# Patient Record
Sex: FEMALE | Race: OTHER | NOT HISPANIC OR LATINO | URBAN - METROPOLITAN AREA
[De-identification: names, ages, dates, MRNs, and addresses within clinical notes are randomized per-mention and may not be internally consistent; named-entity substitution may affect disease eponyms.]

---

## 2018-03-28 ENCOUNTER — EMERGENCY (EMERGENCY)
Facility: HOSPITAL | Age: 38
LOS: 1 days | Discharge: HOME | End: 2018-03-28
Attending: EMERGENCY MEDICINE | Admitting: EMERGENCY MEDICINE
Payer: MEDICAID

## 2018-03-28 VITALS
SYSTOLIC BLOOD PRESSURE: 102 MMHG | RESPIRATION RATE: 16 BRPM | OXYGEN SATURATION: 100 % | DIASTOLIC BLOOD PRESSURE: 64 MMHG | TEMPERATURE: 98 F | HEART RATE: 82 BPM

## 2018-03-28 LAB
ALBUMIN SERPL ELPH-MCNC: 4 G/DL — SIGNIFICANT CHANGE UP (ref 3.3–5)
ALP SERPL-CCNC: 41 U/L — SIGNIFICANT CHANGE UP (ref 40–120)
ALT FLD-CCNC: 16 U/L — SIGNIFICANT CHANGE UP (ref 4–33)
APTT BLD: 27.9 SEC — SIGNIFICANT CHANGE UP (ref 27.5–37.4)
AST SERPL-CCNC: 21 U/L — SIGNIFICANT CHANGE UP (ref 4–32)
BILIRUB SERPL-MCNC: < 0.2 MG/DL — LOW (ref 0.2–1.2)
BUN SERPL-MCNC: 11 MG/DL — SIGNIFICANT CHANGE UP (ref 7–23)
CALCIUM SERPL-MCNC: 8.7 MG/DL — SIGNIFICANT CHANGE UP (ref 8.4–10.5)
CHLORIDE SERPL-SCNC: 104 MMOL/L — SIGNIFICANT CHANGE UP (ref 98–107)
CK MB BLD-MCNC: 2.26 NG/ML — SIGNIFICANT CHANGE UP (ref 1–4.7)
CK SERPL-CCNC: 132 U/L — SIGNIFICANT CHANGE UP (ref 25–170)
CO2 SERPL-SCNC: 23 MMOL/L — SIGNIFICANT CHANGE UP (ref 22–31)
CREAT SERPL-MCNC: 0.6 MG/DL — SIGNIFICANT CHANGE UP (ref 0.5–1.3)
GLUCOSE SERPL-MCNC: 119 MG/DL — HIGH (ref 70–99)
HCT VFR BLD CALC: 25.5 % — LOW (ref 34.5–45)
HGB BLD-MCNC: 7.2 G/DL — LOW (ref 11.5–15.5)
INR BLD: 0.99 — SIGNIFICANT CHANGE UP (ref 0.88–1.17)
MCHC RBC-ENTMCNC: 19.3 PG — LOW (ref 27–34)
MCHC RBC-ENTMCNC: 28.2 % — LOW (ref 32–36)
MCV RBC AUTO: 68.2 FL — LOW (ref 80–100)
NRBC # FLD: 0 — SIGNIFICANT CHANGE UP
PLATELET # BLD AUTO: 270 K/UL — SIGNIFICANT CHANGE UP (ref 150–400)
PMV BLD: 10.4 FL — SIGNIFICANT CHANGE UP (ref 7–13)
POTASSIUM SERPL-MCNC: 3.8 MMOL/L — SIGNIFICANT CHANGE UP (ref 3.5–5.3)
POTASSIUM SERPL-SCNC: 3.8 MMOL/L — SIGNIFICANT CHANGE UP (ref 3.5–5.3)
PROT SERPL-MCNC: 6.9 G/DL — SIGNIFICANT CHANGE UP (ref 6–8.3)
PROTHROM AB SERPL-ACNC: 11 SEC — SIGNIFICANT CHANGE UP (ref 9.8–13.1)
RBC # BLD: 3.74 M/UL — LOW (ref 3.8–5.2)
RBC # FLD: 18.4 % — HIGH (ref 10.3–14.5)
SODIUM SERPL-SCNC: 139 MMOL/L — SIGNIFICANT CHANGE UP (ref 135–145)
TROPONIN T SERPL-MCNC: < 0.06 NG/ML — SIGNIFICANT CHANGE UP (ref 0–0.06)
TSH SERPL-MCNC: 1.15 UIU/ML — SIGNIFICANT CHANGE UP (ref 0.27–4.2)
WBC # BLD: 5.84 K/UL — SIGNIFICANT CHANGE UP (ref 3.8–10.5)
WBC # FLD AUTO: 5.84 K/UL — SIGNIFICANT CHANGE UP (ref 3.8–10.5)

## 2018-03-28 PROCEDURE — 93010 ELECTROCARDIOGRAM REPORT: CPT | Mod: 59

## 2018-03-28 PROCEDURE — 71046 X-RAY EXAM CHEST 2 VIEWS: CPT | Mod: 26

## 2018-03-28 PROCEDURE — 99285 EMERGENCY DEPT VISIT HI MDM: CPT | Mod: 25

## 2018-03-28 RX ORDER — ASPIRIN/CALCIUM CARB/MAGNESIUM 324 MG
324 TABLET ORAL ONCE
Qty: 0 | Refills: 0 | Status: COMPLETED | OUTPATIENT
Start: 2018-03-28 | End: 2018-03-28

## 2018-03-28 RX ADMIN — Medication 324 MILLIGRAM(S): at 22:41

## 2018-03-28 NOTE — ED PROVIDER NOTE - OBJECTIVE STATEMENT
Attending  38yo [facility] female pw chest pain for "a long time" Described as midsternal pressure/tightness intermittent, "sometimes" worse with eating and "moving" exertion. Non radiating, not associated with sob/nausea/ diaphoresis . Endorse a "fluttering sensation" lately.  Feeling lightheaded. no travel. non smoker. not on OCP. no leg swelling or calf tenderness. no abdominal pain. no cp now. Hasn't have any cardiac workup Attending  38yo [place] female pw chest pain for "a long time" Described as midsternal pressure/tightness intermittent, "sometimes" worse with eating and "moving" exertion. Non radiating, not associated with sob/nausea/ diaphoresis . Endorse a "fluttering sensation" lately.  Feeling lightheaded. no travel. non smoker. not on OCP. no leg swelling or calf tenderness. no abdominal pain. no cp now last episode of cp at 5pm. Hasn't have any cardiac workup. took a baby aspirin pta  no pmd. no cardiologist

## 2018-03-28 NOTE — ED ADULT TRIAGE NOTE - CHIEF COMPLAINT QUOTE
C/o chest pain since last night with chest "fluttering". Denies sob or dizziness. Denies medical hx. Denies pain at present after taking ASA at home PTA.      Took 4 baby ASA PTA.

## 2018-03-28 NOTE — ED PROVIDER NOTE - PHYSICAL EXAMINATION
Attending  pt no distress. nontoxic appearing. mmm. no goiter.   normal S1-S2 No resp distress. able to speak in full and clear sentences. no wheeze, rales or stridor. soft nontender abdomen. no  rebound. no guarding. no sign of trauma. no CVAT no pedal edema. no calf tenderness. normal pulses bilateral feet.

## 2018-03-28 NOTE — ED ADULT NURSE NOTE - OBJECTIVE STATEMENT
pt aox3; reports chest pain that has been going on for several weeks; reports last night she felt fluttering in her chest.  Reports she is under stress and that the chest pain worsens with her stress as well as "eating fried foods" Pt presents in no acute respiratory distress at this time; placed on cardiac monitor in NSR; vss.  IV placed in left ac 20g; labs sent.  Pt reports she feels dizzy at this time and wanted her blood sugar to be checks; denies being diabetic but reports her mother is diabetic so she is concerned about her glucose levels.  Fingerstick w/i normal limits. Will continue to monitor/asssess

## 2018-03-28 NOTE — ED PROVIDER NOTE - PROGRESS NOTE DETAILS
No CDU beds available. pt denies hx of anemia, hasn't had hemoglobin checked in a long time. No prior transfusion. LMP 2 weeks ago, "very heavy first two days" not bleeding now. no melena. not dizzy or lightheaded. pt denies hx of anemia, hasn't had hemoglobin checked in a long time. No prior transfusion. LMP 2 weeks ago, "very heavy first two days" not bleeding now. no melena. not dizzy or lightheaded. no URIBE. no orthopnoea pt pending stool occult and labs. Regla: s/o to me by dr. wise. pt with intermittent brief chest pain. no sob, dizziness, lightheadedness and fatigue. hgb found to be 7.2, low mcv. per dr. wise, no need for PRBC. I advised CDU PA to recheck hgb in am.

## 2018-03-28 NOTE — ED ADULT NURSE REASSESSMENT NOTE - NS ED NURSE REASSESS COMMENT FT1
RN Break Coverage : Alert and oriented x 4. Pt received from JORDAN Putnam in no distress. Medication given as ordered.  Sinus rhythm on cardiac monitor. Will continue to monitor.

## 2018-03-28 NOTE — ED PROVIDER NOTE - MEDICAL DECISION MAKING DETAILS
plan ekg, cxr, labs, tele monitoring, admit plan ekg, cxr, labs w tsh, tele monitoring, admit  no PE risk factor, no sig ACS risk factor except guyanense background

## 2018-03-29 VITALS
SYSTOLIC BLOOD PRESSURE: 107 MMHG | TEMPERATURE: 99 F | RESPIRATION RATE: 17 BRPM | DIASTOLIC BLOOD PRESSURE: 63 MMHG | HEART RATE: 78 BPM | OXYGEN SATURATION: 100 %

## 2018-03-29 LAB
APPEARANCE UR: CLEAR — SIGNIFICANT CHANGE UP
BACTERIA # UR AUTO: SIGNIFICANT CHANGE UP
BASOPHILS # BLD AUTO: 0.02 K/UL — SIGNIFICANT CHANGE UP (ref 0–0.2)
BASOPHILS # BLD AUTO: 0.02 K/UL — SIGNIFICANT CHANGE UP (ref 0–0.2)
BASOPHILS # BLD AUTO: 0.04 K/UL — SIGNIFICANT CHANGE UP (ref 0–0.2)
BASOPHILS NFR BLD AUTO: 0.4 % — SIGNIFICANT CHANGE UP (ref 0–2)
BASOPHILS NFR BLD AUTO: 0.5 % — SIGNIFICANT CHANGE UP (ref 0–2)
BASOPHILS NFR BLD AUTO: 0.7 % — SIGNIFICANT CHANGE UP (ref 0–2)
BILIRUB UR-MCNC: NEGATIVE — SIGNIFICANT CHANGE UP
BLD GP AB SCN SERPL QL: NEGATIVE — SIGNIFICANT CHANGE UP
BLOOD UR QL VISUAL: NEGATIVE — SIGNIFICANT CHANGE UP
CHOLEST SERPL-MCNC: 121 MG/DL — SIGNIFICANT CHANGE UP (ref 120–199)
CK MB BLD-MCNC: 1.74 NG/ML — SIGNIFICANT CHANGE UP (ref 1–4.7)
CK MB BLD-MCNC: SIGNIFICANT CHANGE UP (ref 0–2.5)
CK SERPL-CCNC: 103 U/L — SIGNIFICANT CHANGE UP (ref 25–170)
COLOR SPEC: SIGNIFICANT CHANGE UP
EOSINOPHIL # BLD AUTO: 0.05 K/UL — SIGNIFICANT CHANGE UP (ref 0–0.5)
EOSINOPHIL # BLD AUTO: 0.06 K/UL — SIGNIFICANT CHANGE UP (ref 0–0.5)
EOSINOPHIL # BLD AUTO: 0.07 K/UL — SIGNIFICANT CHANGE UP (ref 0–0.5)
EOSINOPHIL NFR BLD AUTO: 0.9 % — SIGNIFICANT CHANGE UP (ref 0–6)
EOSINOPHIL NFR BLD AUTO: 1.2 % — SIGNIFICANT CHANGE UP (ref 0–6)
EOSINOPHIL NFR BLD AUTO: 1.4 % — SIGNIFICANT CHANGE UP (ref 0–6)
FERRITIN SERPL-MCNC: 6.18 NG/ML — LOW (ref 15–150)
GLUCOSE UR-MCNC: NEGATIVE — SIGNIFICANT CHANGE UP
HBA1C BLD-MCNC: 5.1 % — SIGNIFICANT CHANGE UP (ref 4–5.6)
HCG SERPL-ACNC: < 5 MIU/ML — SIGNIFICANT CHANGE UP
HCT VFR BLD CALC: 25 % — LOW (ref 34.5–45)
HCT VFR BLD CALC: 25.5 % — LOW (ref 34.5–45)
HCT VFR BLD CALC: 33 % — LOW (ref 34.5–45)
HDLC SERPL-MCNC: 57 MG/DL — SIGNIFICANT CHANGE UP (ref 45–65)
HGB BLD-MCNC: 7 G/DL — CRITICAL LOW (ref 11.5–15.5)
HGB BLD-MCNC: 7.2 G/DL — LOW (ref 11.5–15.5)
HGB BLD-MCNC: 9.9 G/DL — LOW (ref 11.5–15.5)
IMM GRANULOCYTES # BLD AUTO: 0.01 # — SIGNIFICANT CHANGE UP
IMM GRANULOCYTES # BLD AUTO: 0.01 # — SIGNIFICANT CHANGE UP
IMM GRANULOCYTES # BLD AUTO: 0.02 # — SIGNIFICANT CHANGE UP
IMM GRANULOCYTES NFR BLD AUTO: 0.2 % — SIGNIFICANT CHANGE UP (ref 0–1.5)
IMM GRANULOCYTES NFR BLD AUTO: 0.2 % — SIGNIFICANT CHANGE UP (ref 0–1.5)
IMM GRANULOCYTES NFR BLD AUTO: 0.4 % — SIGNIFICANT CHANGE UP (ref 0–1.5)
IRON SATN MFR SERPL: 12 UG/DL — LOW (ref 30–160)
IRON SATN MFR SERPL: 393 UG/DL — SIGNIFICANT CHANGE UP (ref 140–530)
KETONES UR-MCNC: NEGATIVE — SIGNIFICANT CHANGE UP
LEUKOCYTE ESTERASE UR-ACNC: NEGATIVE — SIGNIFICANT CHANGE UP
LIPID PNL WITH DIRECT LDL SERPL: 63 MG/DL — SIGNIFICANT CHANGE UP
LYMPHOCYTES # BLD AUTO: 1.98 K/UL — SIGNIFICANT CHANGE UP (ref 1–3.3)
LYMPHOCYTES # BLD AUTO: 2.19 K/UL — SIGNIFICANT CHANGE UP (ref 1–3.3)
LYMPHOCYTES # BLD AUTO: 2.55 K/UL — SIGNIFICANT CHANGE UP (ref 1–3.3)
LYMPHOCYTES # BLD AUTO: 38.5 % — SIGNIFICANT CHANGE UP (ref 13–44)
LYMPHOCYTES # BLD AUTO: 44.9 % — HIGH (ref 13–44)
LYMPHOCYTES # BLD AUTO: 46.4 % — HIGH (ref 13–44)
MCHC RBC-ENTMCNC: 19.2 PG — LOW (ref 27–34)
MCHC RBC-ENTMCNC: 19.3 PG — LOW (ref 27–34)
MCHC RBC-ENTMCNC: 21.5 PG — LOW (ref 27–34)
MCHC RBC-ENTMCNC: 28 % — LOW (ref 32–36)
MCHC RBC-ENTMCNC: 28.2 % — LOW (ref 32–36)
MCHC RBC-ENTMCNC: 30 % — LOW (ref 32–36)
MCV RBC AUTO: 68.2 FL — LOW (ref 80–100)
MCV RBC AUTO: 68.7 FL — LOW (ref 80–100)
MCV RBC AUTO: 71.6 FL — LOW (ref 80–100)
MONOCYTES # BLD AUTO: 0.32 K/UL — SIGNIFICANT CHANGE UP (ref 0–0.9)
MONOCYTES # BLD AUTO: 0.42 K/UL — SIGNIFICANT CHANGE UP (ref 0–0.9)
MONOCYTES # BLD AUTO: 0.47 K/UL — SIGNIFICANT CHANGE UP (ref 0–0.9)
MONOCYTES NFR BLD AUTO: 7.3 % — SIGNIFICANT CHANGE UP (ref 2–14)
MONOCYTES NFR BLD AUTO: 7.4 % — SIGNIFICANT CHANGE UP (ref 2–14)
MONOCYTES NFR BLD AUTO: 8.6 % — SIGNIFICANT CHANGE UP (ref 2–14)
MUCOUS THREADS # UR AUTO: SIGNIFICANT CHANGE UP
NEUTROPHILS # BLD AUTO: 2.02 K/UL — SIGNIFICANT CHANGE UP (ref 1.8–7.4)
NEUTROPHILS # BLD AUTO: 2.39 K/UL — SIGNIFICANT CHANGE UP (ref 1.8–7.4)
NEUTROPHILS # BLD AUTO: 2.95 K/UL — SIGNIFICANT CHANGE UP (ref 1.8–7.4)
NEUTROPHILS NFR BLD AUTO: 43.5 % — SIGNIFICANT CHANGE UP (ref 43–77)
NEUTROPHILS NFR BLD AUTO: 45.7 % — SIGNIFICANT CHANGE UP (ref 43–77)
NEUTROPHILS NFR BLD AUTO: 51.8 % — SIGNIFICANT CHANGE UP (ref 43–77)
NITRITE UR-MCNC: NEGATIVE — SIGNIFICANT CHANGE UP
NRBC # FLD: 0 — SIGNIFICANT CHANGE UP
OB PNL STL: NEGATIVE — SIGNIFICANT CHANGE UP
PH UR: 6.5 — SIGNIFICANT CHANGE UP (ref 4.6–8)
PLATELET # BLD AUTO: 241 K/UL — SIGNIFICANT CHANGE UP (ref 150–400)
PLATELET # BLD AUTO: 251 K/UL — SIGNIFICANT CHANGE UP (ref 150–400)
PLATELET # BLD AUTO: 270 K/UL — SIGNIFICANT CHANGE UP (ref 150–400)
PMV BLD: 10.2 FL — SIGNIFICANT CHANGE UP (ref 7–13)
PMV BLD: 10.3 FL — SIGNIFICANT CHANGE UP (ref 7–13)
PMV BLD: 10.4 FL — SIGNIFICANT CHANGE UP (ref 7–13)
PROT UR-MCNC: NEGATIVE MG/DL — SIGNIFICANT CHANGE UP
RBC # BLD: 3.64 M/UL — LOW (ref 3.8–5.2)
RBC # BLD: 3.74 M/UL — LOW (ref 3.8–5.2)
RBC # BLD: 4.61 M/UL — SIGNIFICANT CHANGE UP (ref 3.8–5.2)
RBC # FLD: 18.4 % — HIGH (ref 10.3–14.5)
RBC # FLD: 18.5 % — HIGH (ref 10.3–14.5)
RBC # FLD: 20.2 % — HIGH (ref 10.3–14.5)
RBC CASTS # UR COMP ASSIST: SIGNIFICANT CHANGE UP (ref 0–?)
RH IG SCN BLD-IMP: POSITIVE — SIGNIFICANT CHANGE UP
SP GR SPEC: 1.01 — SIGNIFICANT CHANGE UP (ref 1–1.04)
SQUAMOUS # UR AUTO: SIGNIFICANT CHANGE UP
TRIGL SERPL-MCNC: 45 MG/DL — SIGNIFICANT CHANGE UP (ref 10–149)
TROPONIN T SERPL-MCNC: < 0.06 NG/ML — SIGNIFICANT CHANGE UP (ref 0–0.06)
UIBC SERPL-MCNC: 380.5 UG/DL — HIGH (ref 110–370)
UROBILINOGEN FLD QL: NORMAL MG/DL — SIGNIFICANT CHANGE UP
WBC # BLD: 4.41 K/UL — SIGNIFICANT CHANGE UP (ref 3.8–10.5)
WBC # BLD: 5.49 K/UL — SIGNIFICANT CHANGE UP (ref 3.8–10.5)
WBC # BLD: 5.84 K/UL — SIGNIFICANT CHANGE UP (ref 3.8–10.5)
WBC # FLD AUTO: 4.41 K/UL — SIGNIFICANT CHANGE UP (ref 3.8–10.5)
WBC # FLD AUTO: 5.49 K/UL — SIGNIFICANT CHANGE UP (ref 3.8–10.5)
WBC # FLD AUTO: 5.84 K/UL — SIGNIFICANT CHANGE UP (ref 3.8–10.5)
WBC UR QL: SIGNIFICANT CHANGE UP (ref 0–?)

## 2018-03-29 PROCEDURE — 93010 ELECTROCARDIOGRAM REPORT: CPT | Mod: 77

## 2018-03-29 PROCEDURE — 99235 HOSP IP/OBS SAME DATE MOD 70: CPT | Mod: 25

## 2018-03-29 RX ORDER — DOCUSATE SODIUM 100 MG
1 CAPSULE ORAL
Qty: 90 | Refills: 0
Start: 2018-03-29 | End: 2018-04-27

## 2018-03-29 RX ORDER — FERROUS SULFATE 325(65) MG
1 TABLET ORAL
Qty: 90 | Refills: 0
Start: 2018-03-29 | End: 2018-04-27

## 2018-03-29 RX ORDER — ASPIRIN/CALCIUM CARB/MAGNESIUM 324 MG
81 TABLET ORAL DAILY
Qty: 0 | Refills: 0 | Status: DISCONTINUED | OUTPATIENT
Start: 2018-03-29 | End: 2018-04-01

## 2018-03-29 RX ADMIN — Medication 81 MILLIGRAM(S): at 12:09

## 2018-03-29 NOTE — ED CDU PROVIDER INITIAL DAY NOTE - OBJECTIVE STATEMENT
Attending  38yo [place] female pw chest pain for "a long time" Described as midsternal pressure/tightness intermittent, "sometimes" worse with eating and "moving" exertion. Non radiating, not associated with sob/nausea/ diaphoresis . Endorse a "fluttering sensation" lately.  Feeling lightheaded. no travel. non smoker. not on OCP. no leg swelling or calf tenderness. no abdominal pain. no cp now last episode of cp at 5pm. Hasn't have any cardiac workup. took a baby aspirin pta  no pmd. no cardiologist    CDU DALLAS Sandra Note------  38 yo female, no stated PMH, although pt has not followed with a medical doctor or a Gyn provider in "many years"; presented to the ED for intermittent chest pain, "fluttering sensation", and intermittent lightheadedness, all symptoms x past few months as per above.  Pt. was evaluated in the ED; CE #1 negative; pt found to be anemic with Hb of 7.2; pt denies melena, BRBPR, vaginal bleeding at present.  ED team did not feel immediate transfusion warranted; pt was sent to CDU for cardiac telemetry monitoring, CE #2, stress test, observation/reassessment.  On CDU arrival, pt had no active c/o; CDU plan discussed with pt who verbalized agreement with plan.  Suspect symptomatic anemia as contributor to symptoms; guaiac test sent on CDU exam.

## 2018-03-29 NOTE — ED CDU PROVIDER SUBSEQUENT DAY NOTE - ENMT NEGATIVE STATEMENT, MLM
Ears: no ear pain and no hearing problems. Nose: no nasal congestion and no nasal drainage.Mouth/Throat: no dysphagia, no hoarseness and no throat pain.Neck: no lumps, no pain, no stiffness and no swollen glands

## 2018-03-29 NOTE — ED CDU PROVIDER INITIAL DAY NOTE - MEDICAL DECISION MAKING DETAILS
Cardiac telemetry monitoring, CE #2, stress test, observation/reassessment.    Consider symptomatic anemia as contributing factor to symptoms.

## 2018-03-29 NOTE — ED CDU PROVIDER SUBSEQUENT DAY NOTE - HISTORY
Patient is a 37 year old female who states she had not had regular menses for the month of January and February. States that when menses came in March it lasted for 3 weeks. She went o gynecologist office where lab work was performed. Hgb came back at level of approximately 6. MD recommended coming to the ER.

## 2018-03-29 NOTE — ED CDU PROVIDER SUBSEQUENT DAY NOTE - MEDICAL DECISION MAKING DETAILS
Patient is a 37 year old female with Hgb level 7.2 on presentation. Plan for blood transfusion 2 units PRBC, CBC  monitoring.

## 2018-03-29 NOTE — ED CDU PROVIDER INITIAL DAY NOTE - PROGRESS NOTE DETAILS
Pt resting comfortably in the interim; no issues to date.  Pt. stable at present; will continue to monitor.  Pt. will be signed out to day CDU PA and attending at 0700 hrs.

## 2018-03-29 NOTE — ED CDU PROVIDER DISPOSITION NOTE - CLINICAL COURSE
CODY: 38 yo female with no sig PMH, presented to the ED for intermittent chest pain, "fluttering sensation", and intermittent lightheadedness, x several months, today chest pain persistent,  Pt. was evaluated in the ED; CE #1 negative; pt found to be anemic with Hb of 7.2; pt denies melena, BRBPR, vaginal bleeding at present, pt states she has had heavy and irregular menses, has not seen an OB/GYN in years.  ED team did not feel immediate transfusion warranted; pt was sent to CDU for cardiac telemetry monitoring, CE #2, stress test, observation/reassessment.    Upon reevaluation in CDU pt was thought to have symptomatic anemia, decision to transfuse 2 PRBC, pt's CE #2 was negative, pt h/h improved to 9.9/33.0, pt felt better, ambulating without lightheadedness, no chest pain, no SOB, pt discharged to follow with PMD, ob/gyn and Cardiology.

## 2018-03-29 NOTE — ED CDU PROVIDER SUBSEQUENT DAY NOTE - ATTENDING CONTRIBUTION TO CARE
I performed a face to face bedside interview with patient regarding history of present illness, review of symptoms and past medical history. I completed an independent physical exam.  I have discussed patient's plan of care.   I agree with note as stated above, having amended the EMR as needed to reflect my findings. I have discussed the assessment and plan of care.  This includes during the time I functioned as the attending physician for this patient.  Attending Contribution to Care: agree with plan of PA. pt stable, symptomatic improvement. ambulating around obs unit with no dyspnea on exertion. pt with hx of dyspnea secondary to anemia. pt with no hx of acs, no indication for stress test. pt repeat cbc with sig improvement. ambulating with no symptoms. stable for d/c pending repeat cbc.

## 2018-03-29 NOTE — ED ADULT NURSE REASSESSMENT NOTE - NS ED NURSE REASSESS COMMENT FT1
pt in no acute distress at this time; denies chest pain/sob.  Report endorsed to JORDAN Samayoa in CDU.

## 2018-03-29 NOTE — ED CDU PROVIDER INITIAL DAY NOTE - PHYSICAL EXAMINATION
Attending  normal S1-S2 No resp distress. able to speak in full and clear sentences. no wheeze, rales or stridor. soft nontender abdomen. no  rebound. no guarding. no sign of trauma. no CVAT no pedal edema. no calf tenderness. normal pulses bilateral feet. Aox3

## 2018-03-29 NOTE — ED CDU PROVIDER SUBSEQUENT DAY NOTE - PROGRESS NOTE DETAILS
Patient states she has no lightheadedness or palpitations this am. No signs of bleeding currently. Exam: vs stable heart- RRR s1s2 nl Lungs - clear bilaterally abd - soft NT ND extrem - no edema or cyanosis   Labs - cbc - wbc 4.4 hgb 7.0 hct 25.0 plt 241 Plan for transfusion 2 units PRBC today.  Follow up cbc after transfusion finishes. Continue to monitor. This patient was signed back out to me by CDU day team (DALLAS Tran and Dr. Ruben Flor) at 1900 hrs; test results reviewed and plan discussed.  Per Dr. Flor, pt given 2 units PRBCs; stated symptoms likely secondary to symptomatic anemia; CE x 2 were negative and no events on cardiac monitor in interim; stated no emergent stress test warranted at this time; pt to f/u outpatient.  Plan was that if repeat CBC (performed at 9p which is ~3 hrs s/p completion of PRBC unit #2) showed appropriate rise of Hb (target low 9's), then pt may be d/c'd home.    In the interim, pt has been resting comfortably with no issues or c/o in the interim.  Lungs CTA bilaterally; pt denies active chest discomfort/pain, SOB, URIBE, palpitations, dizziness, lightheadedness, or other c/o.  After CBC results reviewed, I had pt ambulate on unit; pt denied any chest discomfort/pain, SOB, URIBE, palpitations, dizziness, or lightheadedness during ambulation.  Pt. states she feels well and agreeable for d/c home. At discharge, I verbally instructed pt (and manually wrote add-on to d/c papers) that pt should also f/u with Ob/Gyn provider (pt has not seen an Ob/Gyn provider in "years"; I had spoken to this patient last night and again today about importance of routine health maintenance / preventative measures / wellness); pt verbalized understanding to all as discussed.

## 2018-03-29 NOTE — ED CDU PROVIDER INITIAL DAY NOTE - ATTENDING CONTRIBUTION TO CARE
Attending Statement: I have reviewed and agree with all pertinent clinical information, including history and physical exam and agree with treatment plan of the PA, except as noted.  see HPI PE

## 2019-04-29 NOTE — ED ADULT NURSE NOTE - CAS TRG GENERAL AIRWAY, MLM
Patent Split-Thickness Skin Graft Text: The defect edges were debeveled with a #15 scalpel blade.  Given the location of the defect, shape of the defect and the proximity to free margins a split thickness skin graft was deemed most appropriate.  Using a sterile surgical marker, the primary defect shape was transferred to the donor site. The split thickness graft was then harvested.  The skin graft was then placed in the primary defect and oriented appropriately.

## 2019-06-03 NOTE — ED CDU PROVIDER DISPOSITION NOTE - ATTENDING CONTRIBUTION TO CARE
LYDIAL:  I performed a face to face bedside interview with patient regarding history of present illness, review of symptoms and past medical history.   I have discussed patient's plan of care with ACP.   I agree with note as stated above, having amended the EMR as needed to reflect my findings. I have discussed the assessment and plan of care.    Attending Contribution to Care: agree with plan of PA and previous attending. pt stable, symptoms improved, no chest pain, no palpitations, ambulating around obs unit with no dyspnea on exertion. pt had presented to ED with chest pain, palpitations, found to have an H/H 7/25, ,  pt with no hx of acs, decision by Dr. Flor to transfuse 2 PRBC, reevaluate, outpatient cardiology follow-up,  repeat cbc  9.9/33 with sig improvement in symptoms. ambulating with no symptoms. pt understands the need for Ob/GYN follow-up, PMD follow-up and cardiology follow-up, pt stable for discharge Split-Thickness Skin Graft Text: The defect edges were debeveled with a #15c scalpel blade.  Given the location of the defect, shape of the defect and the proximity to free margins a split thickness skin graft was deemed most appropriate.  Using a sterile surgical marker, the primary defect shape was transferred to the donor site. The split thickness graft was then harvested.  The skin graft was then placed in the primary defect and oriented appropriately.

## 2019-10-01 ENCOUNTER — OUTPATIENT (OUTPATIENT)
Dept: OUTPATIENT SERVICES | Facility: HOSPITAL | Age: 39
LOS: 1 days | End: 2019-10-01
Payer: MEDICAID

## 2019-10-01 PROCEDURE — G9001: CPT

## 2019-10-08 ENCOUNTER — EMERGENCY (EMERGENCY)
Facility: HOSPITAL | Age: 39
LOS: 1 days | Discharge: HOME | End: 2019-10-08
Admitting: EMERGENCY MEDICINE
Payer: MEDICAID

## 2019-10-08 VITALS
HEART RATE: 80 BPM | TEMPERATURE: 98 F | RESPIRATION RATE: 16 BRPM | DIASTOLIC BLOOD PRESSURE: 91 MMHG | OXYGEN SATURATION: 100 % | SYSTOLIC BLOOD PRESSURE: 115 MMHG

## 2019-10-08 PROCEDURE — 70486 CT MAXILLOFACIAL W/O DYE: CPT | Mod: 26

## 2019-10-08 PROCEDURE — 70450 CT HEAD/BRAIN W/O DYE: CPT | Mod: 26

## 2019-10-08 PROCEDURE — 29105 APPLICATION LONG ARM SPLINT: CPT | Mod: LT

## 2019-10-08 PROCEDURE — 73080 X-RAY EXAM OF ELBOW: CPT | Mod: 26,LT

## 2019-10-08 PROCEDURE — 73110 X-RAY EXAM OF WRIST: CPT | Mod: 26,LT

## 2019-10-08 PROCEDURE — 99284 EMERGENCY DEPT VISIT MOD MDM: CPT | Mod: 25

## 2019-10-08 PROCEDURE — 73130 X-RAY EXAM OF HAND: CPT | Mod: 26,LT

## 2019-10-08 PROCEDURE — 73030 X-RAY EXAM OF SHOULDER: CPT | Mod: 26,LT

## 2019-10-08 PROCEDURE — 73060 X-RAY EXAM OF HUMERUS: CPT | Mod: 26,LT

## 2019-10-08 PROCEDURE — 73090 X-RAY EXAM OF FOREARM: CPT | Mod: 26,LT

## 2019-10-08 RX ORDER — IBUPROFEN 200 MG
600 TABLET ORAL ONCE
Refills: 0 | Status: COMPLETED | OUTPATIENT
Start: 2019-10-08 | End: 2019-10-08

## 2019-10-08 RX ORDER — IBUPROFEN 200 MG
1 TABLET ORAL
Qty: 21 | Refills: 0
Start: 2019-10-08 | End: 2019-10-14

## 2019-10-08 RX ORDER — OXYCODONE HYDROCHLORIDE 5 MG/1
5 TABLET ORAL ONCE
Refills: 0 | Status: DISCONTINUED | OUTPATIENT
Start: 2019-10-08 | End: 2019-10-08

## 2019-10-08 RX ORDER — TETANUS TOXOID, REDUCED DIPHTHERIA TOXOID AND ACELLULAR PERTUSSIS VACCINE, ADSORBED 5; 2.5; 8; 8; 2.5 [IU]/.5ML; [IU]/.5ML; UG/.5ML; UG/.5ML; UG/.5ML
0.5 SUSPENSION INTRAMUSCULAR ONCE
Refills: 0 | Status: COMPLETED | OUTPATIENT
Start: 2019-10-08 | End: 2019-10-08

## 2019-10-08 RX ORDER — OXYCODONE AND ACETAMINOPHEN 5; 325 MG/1; MG/1
1 TABLET ORAL ONCE
Refills: 0 | Status: DISCONTINUED | OUTPATIENT
Start: 2019-10-08 | End: 2019-10-08

## 2019-10-08 RX ORDER — OXYCODONE HYDROCHLORIDE 5 MG/1
1 TABLET ORAL
Qty: 12 | Refills: 0
Start: 2019-10-08 | End: 2019-10-10

## 2019-10-08 RX ADMIN — Medication 600 MILLIGRAM(S): at 19:02

## 2019-10-08 RX ADMIN — TETANUS TOXOID, REDUCED DIPHTHERIA TOXOID AND ACELLULAR PERTUSSIS VACCINE, ADSORBED 0.5 MILLILITER(S): 5; 2.5; 8; 8; 2.5 SUSPENSION INTRAMUSCULAR at 16:47

## 2019-10-08 RX ADMIN — OXYCODONE AND ACETAMINOPHEN 1 TABLET(S): 5; 325 TABLET ORAL at 16:46

## 2019-10-08 RX ADMIN — OXYCODONE HYDROCHLORIDE 5 MILLIGRAM(S): 5 TABLET ORAL at 19:21

## 2019-10-08 NOTE — ED PROVIDER NOTE - NSFOLLOWUPINSTRUCTIONS_ED_ALL_ED_FT
Advance activity as tolerated.  Continue all previously prescribed medications as directed unless otherwise instructed.  Take Motrin 600 mg every 8 hours as needed for moderate pain or fevers -- take with food. Take Percocet 325/5 once every 6 hours as needed for severe pain -- causes drowsiness; DO NOT drink alcohol, drive, or operate heavy machinery with this medication.  Caution federal law prohibits the transfer of this drug to any person other  than the person for whom it was prescribed. May cause drowsiness.  Alcohol may intensify this effect.  Use care when operating dangerous machinery.  This prescription cannot be refilled. Using more of this medication than prescribed may cause serious breathing problems Keep splint clean and dry.  Apply bacitracin (available over the counter) twice daily to affected area until healed.  Follow up with your primary care physician and orthopedics (referral list provided)  in 48-72 hours- bring copies of your results.  Return to the ER for worsening or persistent symptoms, including but not limited to worsening/persistent pain, numbness, weakness, cold fingers, change in color of skin of fingers, and/or ANY NEW OR CONCERNING SYMPTOMS. If you have issues obtaining follow up, please call: [phone] to obtain a doctor or specialist who takes your insurance in your area.  You may call [phone] to make an appointment with the internal medicine clinic.

## 2019-10-08 NOTE — ED PROVIDER NOTE - CLINICAL SUMMARY MEDICAL DECISION MAKING FREE TEXT BOX
Pt is a 40 y/o F nonsmoker no PMHx p/w fall yesterday. -- elbow fracture as per pt, r/o ICH, not clinically concerning for cervical spine fracture -- pain control, CT head, xray shoulder, humerus, elbow, forearm, wrist, hand, adacel (unable to load xray images from CD that pt brought from urgent care)

## 2019-10-08 NOTE — ED PROVIDER NOTE - NONTENDER LOCATION
suprapubic/periumbilical/left costovertebral angle/right costovertebral angle/left lower quadrant/right lower quadrant/umbilical/left upper quadrant/right upper quadrant

## 2019-10-08 NOTE — ED PROVIDER NOTE - PATIENT PORTAL LINK FT
You can access the FollowMyHealth Patient Portal offered by [facility] by registering at the following website: [email or web address]. By joining [facility]’s FollowMyHealth portal, you will also be able to view your health information using other applications (apps) compatible with our system.

## 2019-10-08 NOTE — ED PROCEDURE NOTE - CPROC ED POST PROC CARE GUIDE1
Instructed patient/caregiver to follow-up with primary care physician./Elevate the injured extremity as instructed./Verbal/written post procedure instructions were given to patient/caregiver./Keep the cast/splint/dressing clean and dry./Instructed patient/caregiver regarding signs and symptoms of infection.

## 2019-10-08 NOTE — ED PROVIDER NOTE - PROGRESS NOTE DETAILS
PA VÁZQUEZ:  Xray shows proximal radius fracture and Questionable dislocation of the trapezium and pisiform of uncertain chronicity. Posterior splint applied and arm placed in sling.  Pt medically stable for discharge.  CT head and maxillofacial negative for acute pathology.  Pt to follow up with PMD and orthopedics (referral list provided).

## 2019-10-08 NOTE — ED ADULT TRIAGE NOTE - CHIEF COMPLAINT QUOTE
Pt had mechanical fall yesterday, went to [facility] and they splinted left arm because she has left elbow fracture. Pt c/o severe pain.

## 2019-10-09 DIAGNOSIS — Z71.89 OTHER SPECIFIED COUNSELING: ICD-10-CM

## 2019-10-18 ENCOUNTER — FORM ENCOUNTER (OUTPATIENT)
Age: 39
End: 2019-10-18

## 2019-10-18 ENCOUNTER — APPOINTMENT (OUTPATIENT)
Dept: ORTHOPEDIC SURGERY | Facility: CLINIC | Age: 39
End: 2019-10-18
Payer: MEDICAID

## 2019-10-18 VITALS
DIASTOLIC BLOOD PRESSURE: 68 MMHG | SYSTOLIC BLOOD PRESSURE: 106 MMHG | HEIGHT: 57 IN | BODY MASS INDEX: 28.05 KG/M2 | WEIGHT: 130 LBS | HEART RATE: 90 BPM

## 2019-10-18 DIAGNOSIS — S42.402D UNSPECIFIED FRACTURE OF LOWER END OF LEFT HUMERUS, SUBSEQUENT ENCOUNTER FOR FRACTURE WITH ROUTINE HEALING: ICD-10-CM

## 2019-10-18 DIAGNOSIS — Z78.9 OTHER SPECIFIED HEALTH STATUS: ICD-10-CM

## 2019-10-18 DIAGNOSIS — M25.532 PAIN IN LEFT WRIST: ICD-10-CM

## 2019-10-18 PROCEDURE — 99203 OFFICE O/P NEW LOW 30 MIN: CPT | Mod: 25

## 2019-10-18 PROCEDURE — 73110 X-RAY EXAM OF WRIST: CPT | Mod: LT

## 2019-10-18 PROCEDURE — 29105 APPLICATION LONG ARM SPLINT: CPT | Mod: LT

## 2019-10-18 PROCEDURE — 73090 X-RAY EXAM OF FOREARM: CPT | Mod: LT

## 2019-10-18 PROCEDURE — 73080 X-RAY EXAM OF ELBOW: CPT | Mod: LT

## 2019-10-19 ENCOUNTER — APPOINTMENT (OUTPATIENT)
Dept: CT IMAGING | Facility: IMAGING CENTER | Age: 39
End: 2019-10-19
Payer: MEDICAID

## 2019-10-19 ENCOUNTER — OUTPATIENT (OUTPATIENT)
Dept: OUTPATIENT SERVICES | Facility: HOSPITAL | Age: 39
LOS: 1 days | End: 2019-10-19
Payer: MEDICAID

## 2019-10-19 DIAGNOSIS — S42.402D UNSPECIFIED FRACTURE OF LOWER END OF LEFT HUMERUS, SUBSEQUENT ENCOUNTER FOR FRACTURE WITH ROUTINE HEALING: ICD-10-CM

## 2019-10-19 PROBLEM — Z78.9 CURRENT NON-SMOKER: Status: ACTIVE | Noted: 2019-10-18

## 2019-10-19 PROBLEM — Z78.9 NO PERTINENT PAST MEDICAL HISTORY: Status: RESOLVED | Noted: 2019-10-18 | Resolved: 2019-10-19

## 2019-10-19 PROCEDURE — 76376 3D RENDER W/INTRP POSTPROCES: CPT

## 2019-10-19 PROCEDURE — 73200 CT UPPER EXTREMITY W/O DYE: CPT | Mod: 26,LT

## 2019-10-19 PROCEDURE — 73200 CT UPPER EXTREMITY W/O DYE: CPT

## 2019-10-19 PROCEDURE — 76376 3D RENDER W/INTRP POSTPROCES: CPT | Mod: 26

## 2019-10-19 RX ORDER — OXYCODONE HYDROCHLORIDE 30 MG/1
TABLET ORAL
Refills: 0 | Status: ACTIVE | COMMUNITY

## 2019-10-19 NOTE — PROCEDURE
[] : left side. The other side left free, and demonstrate good range of motion. Pressure points carefully padded. Cast instructions given. All questions answered. [unfilled] tolerated procedure well. No complications.

## 2019-10-19 NOTE — HISTORY OF PRESENT ILLNESS
[Right] : right hand dominant [FreeTextEntry1] : She presents today for evaluation of her left elbow fracture\par Date of injury: 10/7/19\par Method of injury: fall on sidewalk\par \par The patient initially presented to the emergency room where XR were obtained and the fracture was diagnosed.  A long arm splint was applied and referral submitted to hand surgery for further follow-up. She reports pain in her elbow and wrist. It is localized. Ice helps alleviate the pain. Movement worsens the pain. She has been taking over the counter NSAIDs to help improve the pain. \par

## 2019-10-19 NOTE — ASSESSMENT
[FreeTextEntry1] : 39 year old female presents with left kocher gary fracture and radial head fracture. There is also clinical concern for an occult scaphoid fracture. Treatment options were discussed, including operative and nonoperative treatment. At this time, the patient has opted for operative treatment. Risks, benefits and alternatives were discussed. Risks include but are not limited to the risks associated with anesthesia and the risks associated with the surgery. These include stiffness, need for additional procedures, damage to surrounding structures, infection, nonunion and malunion. I believe the patient understands these risks. The proposed procedure is left elbow open reduction internal fixation. A preop elbow CT and wrist MRI will be obtained. She will be scheduled at the next available date.

## 2019-10-19 NOTE — PHYSICAL EXAM
[de-identified] : Constitutional: Alert and in no acute distress.\par Psychiatric: Oriented to person, place, and time. Insight and judgement were intact and the affect was normal.\par Cardiovascular: Regular rate assessed through peripheral pulses.\par Pulmonary: Nonlabored breathing on room air.\par Lymphatics: No peripheral lymphadenopathy appreciated.\par \par Musculoskeletal: \par Skin is intact. There is tenderness to palpation over the left distal radius, especially over the volar tubercle of the scaphoid and anatomic snuffbox. She is also tender over her elbow and unable to move her elbow without pain.\par \par Mobility is full about the shoulders. Digital flexion and extension is full. Thumb opposition is full to the base of the small fingers bilaterally. \par \par Sensation is intact to light touch in the ulnar, median, and radial nerve distributions. Motor is intact in the ulnar, median, and radial nerve distributions. Fingers are pink and well perfused. Capillary refill is brisk.  [de-identified] : Previous imaging reviewed. \par \par Three views of the left wrist, left elbow and two views of the left forearm were obtained and reviewed. On the elbow films there is a radial head fracture as well as a capitellar shear fracture. On the wrist films there is no discrete scaphoid injury but there is a cortical irregularity at the scaphoid waist.

## 2019-10-20 ENCOUNTER — FORM ENCOUNTER (OUTPATIENT)
Age: 39
End: 2019-10-20

## 2019-10-21 ENCOUNTER — APPOINTMENT (OUTPATIENT)
Dept: MRI IMAGING | Facility: IMAGING CENTER | Age: 39
End: 2019-10-21
Payer: MEDICAID

## 2019-10-21 ENCOUNTER — OUTPATIENT (OUTPATIENT)
Dept: OUTPATIENT SERVICES | Facility: HOSPITAL | Age: 39
LOS: 1 days | End: 2019-10-21
Payer: MEDICAID

## 2019-10-21 DIAGNOSIS — M25.532 PAIN IN LEFT WRIST: ICD-10-CM

## 2019-10-21 PROCEDURE — 73221 MRI JOINT UPR EXTREM W/O DYE: CPT | Mod: 26,LT

## 2019-10-21 PROCEDURE — 73221 MRI JOINT UPR EXTREM W/O DYE: CPT

## 2019-10-22 ENCOUNTER — OUTPATIENT (OUTPATIENT)
Dept: OUTPATIENT SERVICES | Facility: HOSPITAL | Age: 39
LOS: 1 days | End: 2019-10-22
Payer: MEDICAID

## 2019-10-22 VITALS
DIASTOLIC BLOOD PRESSURE: 69 MMHG | SYSTOLIC BLOOD PRESSURE: 100 MMHG | HEIGHT: 57 IN | HEART RATE: 92 BPM | OXYGEN SATURATION: 99 % | RESPIRATION RATE: 15 BRPM | TEMPERATURE: 98 F | WEIGHT: 130.07 LBS

## 2019-10-22 DIAGNOSIS — Z98.891 HISTORY OF UTERINE SCAR FROM PREVIOUS SURGERY: Chronic | ICD-10-CM

## 2019-10-22 DIAGNOSIS — S42.456A: ICD-10-CM

## 2019-10-22 DIAGNOSIS — S42.402A UNSPECIFIED FRACTURE OF LOWER END OF LEFT HUMERUS, INITIAL ENCOUNTER FOR CLOSED FRACTURE: ICD-10-CM

## 2019-10-22 DIAGNOSIS — D64.9 ANEMIA, UNSPECIFIED: ICD-10-CM

## 2019-10-22 DIAGNOSIS — Z01.818 ENCOUNTER FOR OTHER PREPROCEDURAL EXAMINATION: ICD-10-CM

## 2019-10-22 LAB
HCT VFR BLD CALC: 30.9 % — LOW (ref 34.5–45)
HGB BLD-MCNC: 9 G/DL — LOW (ref 11.5–15.5)
MCHC RBC-ENTMCNC: 22.3 PG — LOW (ref 27–34)
MCHC RBC-ENTMCNC: 29.1 GM/DL — LOW (ref 32–36)
MCV RBC AUTO: 76.7 FL — LOW (ref 80–100)
PLATELET # BLD AUTO: 271 K/UL — SIGNIFICANT CHANGE UP (ref 150–400)
RBC # BLD: 4.03 M/UL — SIGNIFICANT CHANGE UP (ref 3.8–5.2)
RBC # FLD: 17.1 % — HIGH (ref 10.3–14.5)
WBC # BLD: 5.27 K/UL — SIGNIFICANT CHANGE UP (ref 3.8–10.5)
WBC # FLD AUTO: 5.27 K/UL — SIGNIFICANT CHANGE UP (ref 3.8–10.5)

## 2019-10-22 PROCEDURE — 85027 COMPLETE CBC AUTOMATED: CPT

## 2019-10-22 PROCEDURE — G0463: CPT

## 2019-10-22 NOTE — H&P PST ADULT - NSICDXPASTMEDICALHX_GEN_ALL_CORE_FT
PAST MEDICAL HISTORY:  Anemia (dx on ED visit from 3/28/18) had 2 bags of blood transfusion in 03/2019 [facility], NO FOLLOW UP DONE (no health insurerce)    Elbow fracture, left 10/06/19, tripped & fell while playing with kids

## 2019-10-22 NOTE — H&P PST ADULT - NSANTHOSAYNRD_GEN_A_CORE
No. CLARITA screening performed.  STOP BANG Legend: 0-2 = LOW Risk; 3-4 = INTERMEDIATE Risk; 5-8 = HIGH Risk

## 2019-10-22 NOTE — H&P PST ADULT - MUSCULOSKELETAL
details… detailed exam no joint swelling/no joint erythema/no joint warmth/decreased ROM due to pain/left arm/no calf tenderness

## 2019-10-22 NOTE — H&P PST ADULT - HEMATOLOGY/LYMPHATICS
negative 60y M w/ hx pancreatic cancer dx 2017 s/p whipple, last chemo cycle 5 weeks ago p/w B/L LE swelling to groin and abdomen concerning for anasarca. ProBNP below cut-off for acute decompensated HF. CBC, CMP, Coags ordered. Pt hx also notable for Factor V Leiden. Given heredtary thrombophilia in setting of prothrombotic state of malignancy, major central venous clot must be considered and evaluated. Currently pending CT Abdomen/Pelvis w/ IV contrast. B/L LE duplex venous US also pending.

## 2019-10-22 NOTE — H&P PST ADULT - NSICDXPROBLEM_GEN_ALL_CORE_FT
PROBLEM DIAGNOSES  Problem: Anemia  Assessment and Plan: lab for cbc sent    Problem: Elbow fracture, left  Assessment and Plan: left elbow open reduction internal fixation

## 2019-10-22 NOTE — H&P PST ADULT - HISTORY OF PRESENT ILLNESS
39 year old female s/p fall on 10/07/19 causing fracture left kochner gary 7 left radial head fracture, scheduled for ORIF of left elbow on 10/24/19.

## 2019-10-23 ENCOUNTER — APPOINTMENT (OUTPATIENT)
Dept: ORTHOPEDIC SURGERY | Facility: CLINIC | Age: 39
End: 2019-10-23
Payer: MEDICAID

## 2019-10-23 VITALS
SYSTOLIC BLOOD PRESSURE: 114 MMHG | BODY MASS INDEX: 28.05 KG/M2 | WEIGHT: 130 LBS | HEIGHT: 57 IN | HEART RATE: 73 BPM | DIASTOLIC BLOOD PRESSURE: 69 MMHG

## 2019-10-23 PROCEDURE — 99214 OFFICE O/P EST MOD 30 MIN: CPT

## 2019-10-23 NOTE — ASSESSMENT
[FreeTextEntry1] : 39 year old female presents with left adial head fracture, coronoid fracture. CT reviewed with Dr Singh who agrees that there is no evidence of capitellar fracture on CT. Patient will begin elbow range of motion and return in one week for repeat XR. Prescription provided today for hand therapy.

## 2019-10-23 NOTE — HISTORY OF PRESENT ILLNESS
[Right] : right hand dominant [FreeTextEntry1] : She presents today for evaluation of her left elbow fracture\par Date of injury: 10/7/19\par Method of injury: fall on sidewalk\par \par She was seen last week and XR were concerning for capitellar shear fracture, radial head fracture. CT was ordered and patient indicated for surgery. She also had pain over snuffbox with distracting elbow injury so MRI wrist ordered. PAtient returns today to review results.

## 2019-10-23 NOTE — PHYSICAL EXAM
[de-identified] : MRI wrist reviewed - no fracture or dislocation noted\par \par CT elbow reviewed - no evidence of coronal capitellar shear fracture, previous radial head fracture visualize, coronoid fracture visualized. [de-identified] : Constitutional: Alert and in no acute distress.\par Psychiatric: Oriented to person, place, and time. Insight and judgement were intact and the affect was normal.\par Cardiovascular: Regular rate assessed through peripheral pulses.\par Pulmonary: Nonlabored breathing on room air.\par Lymphatics: No peripheral lymphadenopathy appreciated.\par \par Musculoskeletal: \par Skin is intact. There is tenderness to palpation over the left distal radius, especially over the volar tubercle of the scaphoid and anatomic snuffbox. She is also tender over her elbow laterally. elbow ROM , full pronation to neutral supination\par \par Mobility is full about the shoulders. Digital flexion and extension is full. Thumb opposition is full to the base of the small fingers bilaterally. \par \par Sensation is intact to light touch in the ulnar, median, and radial nerve distributions. Motor is intact in the ulnar, median, and radial nerve distributions. Fingers are pink and well perfused. Capillary refill is brisk.

## 2019-10-24 ENCOUNTER — APPOINTMENT (OUTPATIENT)
Dept: ORTHOPEDIC SURGERY | Facility: HOSPITAL | Age: 39
End: 2019-10-24

## 2019-10-31 ENCOUNTER — APPOINTMENT (OUTPATIENT)
Dept: ORTHOPEDIC SURGERY | Facility: CLINIC | Age: 39
End: 2019-10-31

## 2021-05-17 NOTE — ED PROVIDER NOTE - QRS
86 Doxepin Pregnancy And Lactation Text: This medication is Pregnancy Category C and it isn't known if it is safe during pregnancy. It is also excreted in breast milk and breast feeding isn't recommended.

## 2022-01-17 NOTE — ED PROVIDER NOTE - RESPIRATORY [-], MLM
Wegovy started 1 month ago - lost 20 lb in 1 month. Recent blood pressure med changes including increase amlodipine last month so think multiple things are playing role with current lightheadedness. Reached out to cardiology to see their thoughts on lowering amlodipine dose. We are going to ontinue Wegovy 0.25 mg weeklyf or now until we can see what is going on with the lightheadedness. Kathy AVITIA
no orthopnea/no exertional dyspnea/no cough/no hemoptysis/no shortness of breath

## 2022-09-29 PROBLEM — S42.402A UNSPECIFIED FRACTURE OF LOWER END OF LEFT HUMERUS, INITIAL ENCOUNTER FOR CLOSED FRACTURE: Chronic | Status: ACTIVE | Noted: 2019-10-22

## 2022-09-29 PROBLEM — D64.9 ANEMIA, UNSPECIFIED: Chronic | Status: ACTIVE | Noted: 2018-03-29

## 2022-10-03 ENCOUNTER — APPOINTMENT (OUTPATIENT)
Dept: ORTHOPEDIC SURGERY | Facility: CLINIC | Age: 42
End: 2022-10-03

## 2023-06-29 NOTE — ED PROVIDER NOTE - TOBACCO USE
well developed, well nourished , in no acute distress , ambulating without difficulty , normal communication ability Never smoker

## 2023-09-09 NOTE — ED ADULT NURSE NOTE - CHIEF COMPLAINT QUOTE
C/o chest pain since last night with chest "fluttering". Denies sob or dizziness. Denies medical hx. Denies pain at present after taking ASA at home PTA.      Took 4 baby ASA PTA. none

## 2024-05-02 NOTE — H&P PST ADULT - SKIN
functional mobility deficits, analyze and address ROM deficits, analyze and address strength deficits, analyze and address soft tissue restrictions, and analyze and cue for proper movement patterns to address functional deficits and attain remaining goals.    Progress toward goals / Updated goals:  []  See Progress Note/Recertification    New Goals to be achieved in __4__ WEEKS  1. Pt to demo x20 full push ups with good technique and without pain for return to PLOF  2.  Patient to CC pec fly 10# x 10 without chest pain, single arm chest press 15# x 10: without chest pain.  3. GROC score to be +5: a good deal better.    Next PN/ RC due 5/21/24      PLAN  - Continue Plan of Care  - Upgrade activities as tolerated  - Discharge due to :      Jesus Gallegos PTA    5/2/2024    6:10 AM    Future Appointments   Date Time Provider Department Center   5/2/2024  9:40 AM Jesus Gallegos PTA [facility]   5/7/2024  9:40 AM Jesus Galleogs PTA [facility]   5/9/2024  9:40 AM Jesus Gallegos PTA [facility]        
No lesions; no rash

## 2024-10-21 NOTE — ED PROVIDER NOTE - OBJECTIVE STATEMENT
Group Topic:  Group Psychotherapy    Date: 10/21/2024  Start Time: 1230  End Time: 1330  Facilitators: Austin Rain LPC    Focus:  Process  Number in attendance: 5  Pt joined in a discussion sharing their worries and problem solving how to communicate those thoughts, feelings, emotions with others to have their voice heard appropriately.  Feedback was provided by Writer and peers to encourage positive decision making. Pt listened to peers share to help relate experiences.     Method: Group  Attendance: Present  Participation: Active  Patient Response: Appropriate feedback, Attentive, Demonstrated insight, Good eye contact, Interested in topic, and Interactive  Mood: Nervous and Normal  Affect: Type: Euthymic (normal mood)     Pt actively engaged in Problem Solving discussion and processing.  Per pt, conflict with her mother is due to her mother not accepting pt's disclosure of being romantically interested in females.  Pt identifies as she/her and a \"stud\" (female with masculine traits and interested in other females).  Pt feels she was able to discuss this with her father and he accepts her in this way, but her mother refuses.   Pt is a 40 y/o F nonsmoker no PMHx p/w fall yesterday.  Pt states yesterday she was running, playing tag with children, when she tripped over child, causing her to fall onto sidewalk.  Pt notes left temporal region of head struck sidewalk w/o LOC.  Pt notes she landed on left elbow, left hand and bilateral knees.  She notes sudden onset severe pain at left elbow at time of fall, which worsens with movement.  Pt went to urgent care today where she was found to have left elbow fracture and had splint placed; pt was then sent to ED to have cast placed by orthopedics.  Pt notes moderate intensity left parietal headache, nonradiating.  Pt notes mild aching pain at left lateral aspect of neck and lateral aspect of shoulder.  Pt notes moderate intensity pain at left wrist which worsens with movement.  Pt notes mild pain at bilateral knees.  Pt not sure of last tetanus shot.  Pt denies any fevers, chills, numbness, weakness, chest pain, SOB, abdominal pain, use of blood thinners, visual/auditory disturbances, hip pain, back pain, pain at sites other than what was noted above, or any other specific complaints. Pt is a 38 y/o F nonsmoker no PMHx p/w fall yesterday.  Pt states yesterday she was running, playing tag with children, when she tripped over child, causing her to fall onto sidewalk.  Pt notes left temporal region of head struck sidewalk w/o LOC.  Pt notes she landed on left elbow, left hand and bilateral knees.  She notes sudden onset severe pain at left elbow at time of fall, which worsens with movement.  Pt went to urgent care today where she was found to have left elbow fracture and had splint placed; pt was then sent to ED to have cast placed by orthopedics.  Pt notes moderate intensity left parietal headache, nonradiating.  Pt notes mild aching pain at left lateral aspect of neck and lateral aspect of shoulder.  Pt notes moderate intensity pain at left wrist which worsens with movement.  Pt notes mild pain at bilateral knees.  Pt not sure of last tetanus shot.  Pt denies any fevers, chills, numbness, weakness, chest pain, SOB, abdominal pain, use of blood thinners, visual/auditory disturbances, hip pain, back pain, dizziness, lightheadedness, difficulty walking, incontinence, pain at sites other than what was noted above, or any other specific complaints.

## 2025-04-14 NOTE — ED CDU PROVIDER DISPOSITION NOTE - PLAN OF CARE
Schedule appointment for a Primary Medical Doctor and a Cardiologist; advise follow up within 2-3 days of ER discharge.  If you need to find a doctor to follow up with, you may call the [facility] Patient Access Services helpline at [phone] to find names/contact #s for a practitioner (or specialist) to follow up with.  Bring your discharge papers / test results with you to your follow up appointment(s).  Rest / no strenuous activity.  Stay well hydrated.  MEDICATIONS:  See attached medication sheet(s).  ***Return to the Emergency Department if you experience any new / worsening symptoms or have any problems / concerns.***
No

## 2025-07-07 ENCOUNTER — EMERGENCY (EMERGENCY)
Facility: HOSPITAL | Age: 45
LOS: 1 days | End: 2025-07-07
Admitting: PERSONAL EMERGENCY RESPONSE ATTENDANT
Payer: COMMERCIAL

## 2025-07-07 VITALS
HEART RATE: 69 BPM | RESPIRATION RATE: 18 BRPM | OXYGEN SATURATION: 100 % | HEIGHT: 57 IN | TEMPERATURE: 98 F | WEIGHT: 160.06 LBS | SYSTOLIC BLOOD PRESSURE: 119 MMHG | DIASTOLIC BLOOD PRESSURE: 78 MMHG

## 2025-07-07 VITALS
SYSTOLIC BLOOD PRESSURE: 128 MMHG | RESPIRATION RATE: 20 BRPM | DIASTOLIC BLOOD PRESSURE: 84 MMHG | TEMPERATURE: 98 F | OXYGEN SATURATION: 100 % | HEART RATE: 59 BPM

## 2025-07-07 DIAGNOSIS — Z98.891 HISTORY OF UTERINE SCAR FROM PREVIOUS SURGERY: Chronic | ICD-10-CM

## 2025-07-07 LAB
ADD ON TEST-SPECIMEN IN LAB: SIGNIFICANT CHANGE UP
ALBUMIN SERPL ELPH-MCNC: 4.2 G/DL — SIGNIFICANT CHANGE UP (ref 3.3–5)
ALP SERPL-CCNC: 57 U/L — SIGNIFICANT CHANGE UP (ref 40–120)
ALT FLD-CCNC: 12 U/L — SIGNIFICANT CHANGE UP (ref 4–33)
ANION GAP SERPL CALC-SCNC: 13 MMOL/L — SIGNIFICANT CHANGE UP (ref 7–14)
AST SERPL-CCNC: 17 U/L — SIGNIFICANT CHANGE UP (ref 4–32)
BASOPHILS # BLD AUTO: 0.04 K/UL — SIGNIFICANT CHANGE UP (ref 0–0.2)
BASOPHILS NFR BLD AUTO: 0.7 % — SIGNIFICANT CHANGE UP (ref 0–2)
BILIRUB SERPL-MCNC: 0.2 MG/DL — SIGNIFICANT CHANGE UP (ref 0.2–1.2)
BUN SERPL-MCNC: 13 MG/DL — SIGNIFICANT CHANGE UP (ref 7–23)
CALCIUM SERPL-MCNC: 9.2 MG/DL — SIGNIFICANT CHANGE UP (ref 8.4–10.5)
CHLORIDE SERPL-SCNC: 105 MMOL/L — SIGNIFICANT CHANGE UP (ref 98–107)
CO2 SERPL-SCNC: 20 MMOL/L — LOW (ref 22–31)
CREAT SERPL-MCNC: 0.66 MG/DL — SIGNIFICANT CHANGE UP (ref 0.5–1.3)
EGFR: 109 ML/MIN/1.73M2 — SIGNIFICANT CHANGE UP
EGFR: 109 ML/MIN/1.73M2 — SIGNIFICANT CHANGE UP
EOSINOPHIL # BLD AUTO: 0.16 K/UL — SIGNIFICANT CHANGE UP (ref 0–0.5)
EOSINOPHIL NFR BLD AUTO: 2.7 % — SIGNIFICANT CHANGE UP (ref 0–6)
GLUCOSE SERPL-MCNC: 85 MG/DL — SIGNIFICANT CHANGE UP (ref 70–99)
HCT VFR BLD CALC: 32.9 % — LOW (ref 34.5–45)
HGB BLD-MCNC: 10.4 G/DL — LOW (ref 11.5–15.5)
IMM GRANULOCYTES # BLD AUTO: 0.01 K/UL — SIGNIFICANT CHANGE UP (ref 0–0.07)
IMM GRANULOCYTES NFR BLD AUTO: 0.2 % — SIGNIFICANT CHANGE UP (ref 0–0.9)
LIDOCAIN IGE QN: 32 U/L — SIGNIFICANT CHANGE UP (ref 7–60)
LYMPHOCYTES # BLD AUTO: 2.55 K/UL — SIGNIFICANT CHANGE UP (ref 1–3.3)
LYMPHOCYTES NFR BLD AUTO: 43.5 % — SIGNIFICANT CHANGE UP (ref 13–44)
MAGNESIUM SERPL-MCNC: 2 MG/DL — SIGNIFICANT CHANGE UP (ref 1.6–2.6)
MCHC RBC-ENTMCNC: 25.9 PG — LOW (ref 27–34)
MCHC RBC-ENTMCNC: 31.6 G/DL — LOW (ref 32–36)
MCV RBC AUTO: 81.8 FL — SIGNIFICANT CHANGE UP (ref 80–100)
MONOCYTES # BLD AUTO: 0.38 K/UL — SIGNIFICANT CHANGE UP (ref 0–0.9)
MONOCYTES NFR BLD AUTO: 6.5 % — SIGNIFICANT CHANGE UP (ref 2–14)
NEUTROPHILS # BLD AUTO: 2.72 K/UL — SIGNIFICANT CHANGE UP (ref 1.8–7.4)
NEUTROPHILS NFR BLD AUTO: 46.4 % — SIGNIFICANT CHANGE UP (ref 43–77)
NRBC # BLD AUTO: 0 K/UL — SIGNIFICANT CHANGE UP (ref 0–0)
NRBC # FLD: 0 K/UL — SIGNIFICANT CHANGE UP (ref 0–0)
NRBC BLD AUTO-RTO: 0 /100 WBCS — SIGNIFICANT CHANGE UP (ref 0–0)
PLATELET # BLD AUTO: 329 K/UL — SIGNIFICANT CHANGE UP (ref 150–400)
PMV BLD: 10.1 FL — SIGNIFICANT CHANGE UP (ref 7–13)
POTASSIUM SERPL-MCNC: 3.7 MMOL/L — SIGNIFICANT CHANGE UP (ref 3.5–5.3)
POTASSIUM SERPL-SCNC: 3.7 MMOL/L — SIGNIFICANT CHANGE UP (ref 3.5–5.3)
PROT SERPL-MCNC: 7.2 G/DL — SIGNIFICANT CHANGE UP (ref 6–8.3)
RBC # BLD: 4.02 M/UL — SIGNIFICANT CHANGE UP (ref 3.8–5.2)
RBC # FLD: 14.3 % — SIGNIFICANT CHANGE UP (ref 10.3–14.5)
SODIUM SERPL-SCNC: 138 MMOL/L — SIGNIFICANT CHANGE UP (ref 135–145)
TROPONIN T, HIGH SENSITIVITY RESULT: <6 NG/L — SIGNIFICANT CHANGE UP
WBC # BLD: 5.86 K/UL — SIGNIFICANT CHANGE UP (ref 3.8–10.5)
WBC # FLD AUTO: 5.86 K/UL — SIGNIFICANT CHANGE UP (ref 3.8–10.5)

## 2025-07-07 PROCEDURE — 99285 EMERGENCY DEPT VISIT HI MDM: CPT

## 2025-07-07 PROCEDURE — 93010 ELECTROCARDIOGRAM REPORT: CPT

## 2025-07-07 NOTE — ED PROVIDER NOTE - NSICDXPASTSURGICALHX_GEN_ALL_CORE_FT
PAST SURGICAL HISTORY:  H/O:  x2( 14 & 12 years ago)    Other single delivery by caesarean section

## 2025-07-07 NOTE — ED ADULT NURSE NOTE - OBJECTIVE STATEMENT
Pt received in intake 15. Pt alert and oriented x 4, ambulatory at baseline. Pt denies past medical history. Pt c/o intermittent chest pain that began a few weeks ago. Pt reports pain has resolved at this time. Pt endorses left sided chest tightness at this time, Pt reports taking Tylenol this AM with partial relief. Respirations even and unlabored, NAD. Pt denies shortness of breath, N/V/D, headache, dizziness, weakness, fever, chills. 20G IV in the right AC labs drawn per orders. Bed in lowest position, safety maintained.

## 2025-07-07 NOTE — ED PROVIDER NOTE - OBJECTIVE STATEMENT
45-year-old female with no significant past medical history presents to the ER with complaint of several months worth of nonexertional, nonpleuritic chest pain, went to outside facility had a stress test and lab work performed which were unremarkable in early spring.  Patient states sometimes pain associated with food intake, nausea no vomiting and sometimes associated with lifting heavy objects.  Denies shortness of breath, palpitations, lightheadedness, dizziness, fevers, chills, coughing, abdominal pain, weakness, numbness, tingling, urinary symptoms.  Patient reports a family history of diabetes and requesting A1c check at this time.

## 2025-07-07 NOTE — ED PROVIDER NOTE - NSICDXPASTMEDICALHX_GEN_ALL_CORE_FT
PAST MEDICAL HISTORY:  Anemia (dx on ED visit from 3/28/18) had 2 bags of blood transfusion in 03/2019 [facility], NO FOLLOW UP DONE (no health insurerce)    Elbow fracture, left 10/06/19, tripped & fell while playing with kids

## 2025-07-07 NOTE — ED PROVIDER NOTE - PATIENT PORTAL LINK FT
You can access the FollowMyHealth Patient Portal offered by [facility] by registering at the following website: [email or web address]. By joining [facility]’s FollowMyHealth portal, you will also be able to view your health information using other applications (apps) compatible with our system.

## 2025-07-07 NOTE — ED ADULT TRIAGE NOTE - CHIEF COMPLAINT QUOTE
Pt with no past medical history comes in c/o chest pain on and off several weeks but today it is there steadily. c/o chest tightness and nausea. Denies SOB/ palpitation. Pt appears comfortable.

## 2025-07-07 NOTE — ED PROVIDER NOTE - NSFOLLOWUPINSTRUCTIONS_ED_ALL_ED_FT
You were seen in the emergency room  your blood work is normal, your EKG is normal and your Chest xray and cardiac enzymes are normal  please follow up with primary care and cardiology- you will receive a call in a few days to help make an appointment  return to ER if any new, worsening or persistent symptoms

## 2025-07-07 NOTE — ED PROVIDER NOTE - CLINICAL SUMMARY MEDICAL DECISION MAKING FREE TEXT BOX
45-year-old female with no significant past medical history presents to the ER with complaint of several months worth of nonexertional, nonpleuritic chest pain, went to outside facility had a stress test and lab work performed which were unremarkable in early spring.  Patient states sometimes pain associated with food intake, nausea no vomiting and sometimes associated with lifting heavy objects.  Denies shortness of breath, palpitations, lightheadedness, dizziness, fevers, chills, coughing, abdominal pain, weakness, numbness, tingling, urinary symptoms.  Patient reports a family history of diabetes and requesting A1c check at this time.  ekg NSR, twi isolated in III.   No fam hx cad.   plan for labs, trop, cxr pending above if normal dc with pmd and cards f.u

## 2025-07-07 NOTE — ED PROVIDER NOTE - AVIAN FLU SYMPTOMS
"CHIEF COMPLAINT:    Chief Complaint   Patient presents with   â¢ Medication Management     ORTHO TRI-CYCLEN LO 0.18/0.215/0.25 MG-25 MCG tablet       HISTORY OF PRESENT ILLNESS:  Della Fall is a 24year old female who presents  For follow up of her OCP management. She is currently on orthotricyclen _lo and is doing well. No issues with breakthrough bleeding. She is on isotriretinoin and is doing very well with her acne. Denies having unprotected sex. Denies having abnormal vaginal discharge or bleeding. 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 Is interested in health careers. Recent illnesses: none  Sick contacts: none known     EXAMINATION:       Visit Vitals  /70   Temp 97.6 Â°F (36.4 Â°C) (Temporal)   Ht 5' 8.25"" (1.734 m)   Wt 91.9 kg   LMP 01/13/2019 (Exact Date)   BMI 30.58 kg/mÂ²   RR 18,Hr88  Pt alert awake in NAD       HEENT:   Eyes -PERRLA, EOMI. Conjunctivae and lids normal, sclera anicteric. Nose - normal              Ears - external - nl; canals - nl; TM's - nl landmarks, light reflex and mobility               Throat - moist mucous membranes, +1 tonsils w/o erythema, exudates or petechia              Sinuses - non tender       Neck: Full range of motion, supple. No adenopathy, thyromegaly. No jugular veinous distension. Carotids 2+, no bruits       Lungs: clear to auscultation and percussion       Heart: Regular rate and rhythm. S1, S2, no gallops or murmurs. Abdomen: Soft, non-tender, bowel sounds normal, no masses, hepatomegaly or splenomegaly noted     Cns: No deficit    Skin: acne significantly improved on face and chest  ASSESSMENT:  Acne on isotretinoin                               OCPmanangement                               Menstrual irregularities-improved  Weight gain  Adjustment disorder-improving with therapy  PLAN:  Discussed appropriate diet and exercise. Discussed adequate hydration. Discussed signs and symptoms of serious illness.   Discussed " supportive care. Will continue with MVT and Vitamin D supplement. Will be seen back in 6 months. Will continue with farhad mathew No

## 2025-07-07 NOTE — ED PROVIDER NOTE - PROGRESS NOTE DETAILS
DALLAS Cedillo: received pt in sign out pending troponin, cxr  troponin <6, CXR clear, signout plan to d/c with close cardiology follow up  ekg non ischemic  pt reassessed, chest pain free at this time, had normal stress this year  will d/c with close cardiology follow up and strict return precautions

## 2025-07-08 PROCEDURE — 71046 X-RAY EXAM CHEST 2 VIEWS: CPT | Mod: 26
